# Patient Record
Sex: FEMALE | Race: OTHER | HISPANIC OR LATINO | URBAN - METROPOLITAN AREA
[De-identification: names, ages, dates, MRNs, and addresses within clinical notes are randomized per-mention and may not be internally consistent; named-entity substitution may affect disease eponyms.]

---

## 2018-06-01 ENCOUNTER — OUTPATIENT (OUTPATIENT)
Dept: OUTPATIENT SERVICES | Facility: HOSPITAL | Age: 73
LOS: 1 days | Discharge: HOME | End: 2018-06-01

## 2019-05-13 ENCOUNTER — APPOINTMENT (OUTPATIENT)
Dept: OTOLARYNGOLOGY | Facility: CLINIC | Age: 74
End: 2019-05-13
Payer: MEDICARE

## 2019-05-13 VITALS — WEIGHT: 124 LBS | HEIGHT: 58.27 IN | BODY MASS INDEX: 25.68 KG/M2

## 2019-05-13 VITALS
OXYGEN SATURATION: 95 % | HEART RATE: 64 BPM | TEMPERATURE: 97.9 F | DIASTOLIC BLOOD PRESSURE: 75 MMHG | SYSTOLIC BLOOD PRESSURE: 122 MMHG

## 2019-05-13 DIAGNOSIS — Z86.39 PERSONAL HISTORY OF OTHER ENDOCRINE, NUTRITIONAL AND METABOLIC DISEASE: ICD-10-CM

## 2019-05-13 DIAGNOSIS — Z87.19 PERSONAL HISTORY OF OTHER DISEASES OF THE DIGESTIVE SYSTEM: ICD-10-CM

## 2019-05-13 DIAGNOSIS — Z80.7 FAMILY HISTORY OF OTHER MALIGNANT NEOPLASMS OF LYMPHOID, HEMATOPOIETIC AND RELATED TISSUES: ICD-10-CM

## 2019-05-13 DIAGNOSIS — Z86.69 PERSONAL HISTORY OF OTHER DISEASES OF THE NERVOUS SYSTEM AND SENSE ORGANS: ICD-10-CM

## 2019-05-13 PROBLEM — Z00.00 ENCOUNTER FOR PREVENTIVE HEALTH EXAMINATION: Status: ACTIVE | Noted: 2019-05-13

## 2019-05-13 PROCEDURE — 31575 DIAGNOSTIC LARYNGOSCOPY: CPT

## 2019-05-13 PROCEDURE — 99203 OFFICE O/P NEW LOW 30 MIN: CPT | Mod: 25

## 2019-05-13 RX ORDER — LEVOTHYROXINE SODIUM 0.05 MG/1
50 TABLET ORAL
Refills: 0 | Status: ACTIVE | COMMUNITY

## 2019-05-13 RX ORDER — OMEPRAZOLE 40 MG/1
40 CAPSULE, DELAYED RELEASE ORAL TWICE DAILY
Qty: 60 | Refills: 10 | Status: ACTIVE | COMMUNITY
Start: 2019-05-13 | End: 1900-01-01

## 2019-05-13 RX ORDER — VITAMIN E 268 MG
400 CAPSULE ORAL
Refills: 0 | Status: ACTIVE | COMMUNITY

## 2019-05-13 NOTE — REVIEW OF SYSTEMS
[Patient Intake Form Reviewed] : Patient intake form was reviewed [Throat Pain] : throat pain [As Noted in HPI] : as noted in HPI [Throat Dryness] : throat dryness [Negative] : Heme/Lymph

## 2019-06-03 NOTE — CONSULT LETTER
[Dear  ___] : Dear  [unfilled], [Consult Letter:] : I had the pleasure of evaluating your patient, [unfilled]. [Please see my note below.] : Please see my note below. [Consult Closing:] : Thank you very much for allowing me to participate in the care of this patient.  If you have any questions, please do not hesitate to contact me. [Sincerely,] : Sincerely, [FreeTextEntry3] : Flaco Alford MD, FACS\par Professor of Otolaryngology, [employer] at [facility]\par Director, [facility], [facility]\par , Head & Ne[facility] Service Line, [facility]\par

## 2019-06-03 NOTE — HISTORY OF PRESENT ILLNESS
[de-identified] : 73 years old female patient with history of throat pain for over 1 years.  Patient is present today in the office Pharyngitis.  GERD injury, Lingual tonsil hypertrophy

## 2019-06-03 NOTE — REASON FOR VISIT
[Initial Evaluation] : an initial evaluation for [FreeTextEntry2] : throat pain for over 1 years. Patient states her level of severity is a level 8 out of 10 and it occurs constant.  Patient states nothing helps to improve or worsens her throat pain for over 1 years

## 2019-06-03 NOTE — PROCEDURE
[Image(s) Captured] : image(s) captured and filed [Topical Lidocaine] : topical lidocaine [Flexible Endoscope] : examined with the flexible endoscope [Serial Number: ___] : Serial Number: [unfilled] [Glottis Arytenoid Cartilages Erythema] : bilateral arytenoid ~M erythema [de-identified] : Pharyngitis.  GERD injury, Lingual tonsil hypertrophy.  Lingual Tonsil Hypertrophy

## 2019-06-13 ENCOUNTER — APPOINTMENT (OUTPATIENT)
Dept: OTOLARYNGOLOGY | Facility: CLINIC | Age: 74
End: 2019-06-13
Payer: MEDICARE

## 2019-06-13 VITALS
HEART RATE: 65 BPM | OXYGEN SATURATION: 96 % | RESPIRATION RATE: 15 BRPM | DIASTOLIC BLOOD PRESSURE: 76 MMHG | TEMPERATURE: 98.1 F | SYSTOLIC BLOOD PRESSURE: 138 MMHG

## 2019-06-13 DIAGNOSIS — Z87.09 PERSONAL HISTORY OF OTHER DISEASES OF THE RESPIRATORY SYSTEM: ICD-10-CM

## 2019-06-13 DIAGNOSIS — R49.9 UNSPECIFIED VOICE AND RESONANCE DISORDER: ICD-10-CM

## 2019-06-13 DIAGNOSIS — R07.0 PAIN IN THROAT: ICD-10-CM

## 2019-06-13 DIAGNOSIS — K21.9 GASTRO-ESOPHAGEAL REFLUX DISEASE W/OUT ESOPHAGITIS: ICD-10-CM

## 2019-06-13 PROCEDURE — 99213 OFFICE O/P EST LOW 20 MIN: CPT

## 2019-06-13 NOTE — HISTORY OF PRESENT ILLNESS
[de-identified] : 73 years old female patient with history of Pharyngitis.  GERD injury, Lingual tonsil hypertrophy.  Patient is present today in the office with improved  Pharyngitis.  GERD injury, Lingual tonsil hypertrophy

## 2019-06-13 NOTE — CONSULT LETTER
[Dear  ___] : Dear  [unfilled], [Consult Letter:] : I had the pleasure of evaluating your patient, [unfilled]. [Please see my note below.] : Please see my note below. [Consult Closing:] : Thank you very much for allowing me to participate in the care of this patient.  If you have any questions, please do not hesitate to contact me. [Sincerely,] : Sincerely, [FreeTextEntry3] : Flaco Alford MD, FACS\par Professor of Otolaryngology, [facility] [employer] at [facility]\par Director, [facility], [facility] [place]\par , [facility] Service Line, [facility]\par

## 2019-06-13 NOTE — REVIEW OF SYSTEMS
[As Noted in HPI] : as noted in HPI [Patient Intake Form Reviewed] : Patient intake form was reviewed [Throat Pain] : throat pain [Throat Dryness] : throat dryness [Negative] : Endocrine

## 2019-06-13 NOTE — REASON FOR VISIT
[Subsequent Evaluation] : a subsequent evaluation for [FreeTextEntry2] : Pharyngitis.  GERD injury, Lingual tonsil hypertrophy

## 2019-06-20 ENCOUNTER — RESULT REVIEW (OUTPATIENT)
Age: 74
End: 2019-06-20

## 2019-06-21 ENCOUNTER — APPOINTMENT (OUTPATIENT)
Dept: OBGYN | Facility: CLINIC | Age: 74
End: 2019-06-21
Payer: MEDICARE

## 2019-06-21 ENCOUNTER — OUTPATIENT (OUTPATIENT)
Dept: OUTPATIENT SERVICES | Facility: HOSPITAL | Age: 74
LOS: 1 days | End: 2019-06-21
Payer: MEDICARE

## 2019-06-21 VITALS
DIASTOLIC BLOOD PRESSURE: 67 MMHG | HEART RATE: 59 BPM | HEIGHT: 58.27 IN | TEMPERATURE: 98.2 F | WEIGHT: 120 LBS | BODY MASS INDEX: 24.85 KG/M2 | SYSTOLIC BLOOD PRESSURE: 134 MMHG

## 2019-06-21 DIAGNOSIS — Z00.00 ENCOUNTER FOR GENERAL ADULT MEDICAL EXAMINATION WITHOUT ABNORMAL FINDINGS: ICD-10-CM

## 2019-06-21 DIAGNOSIS — N76.2 ACUTE VULVITIS: ICD-10-CM

## 2019-06-21 PROCEDURE — 87624 HPV HI-RISK TYP POOLED RSLT: CPT

## 2019-06-21 PROCEDURE — G0463: CPT

## 2019-06-21 PROCEDURE — 88175 CYTOPATH C/V AUTO FLUID REDO: CPT

## 2019-06-21 PROCEDURE — 99203 OFFICE O/P NEW LOW 30 MIN: CPT

## 2019-06-21 RX ORDER — CLOTRIMAZOLE AND BETAMETHASONE DIPROPIONATE 10; .5 MG/G; MG/G
1-0.05 CREAM TOPICAL TWICE DAILY
Qty: 1 | Refills: 0 | Status: ACTIVE | COMMUNITY
Start: 2019-06-21 | End: 1900-01-01

## 2019-06-21 NOTE — PHYSICAL EXAM
[Awake] : awake [Alert] : alert [Acute Distress] : no acute distress [Mass] : no breast mass [Nipple Discharge] : no nipple discharge [Axillary LAD] : no axillary lymphadenopathy [Tender] : non tender [Soft] : soft [No Lesions] : no genitalia lesions [Oriented x3] : oriented to person, place, and time [Vulvitis] : vulvitis [Normal] : cervix [No Bleeding] : there was no active vaginal bleeding [Uterine Adnexae] : were not tender and not enlarged [de-identified] : vulva- slight erythema

## 2019-06-21 NOTE — HISTORY OF PRESENT ILLNESS
[Menarche Age: ____] : age at menarche was [unfilled] [Menopause Age: ____] : age at menopause was [unfilled] [Sexually Active] : is not sexually active

## 2019-06-22 LAB — HPV HIGH+LOW RISK DNA PNL CVX: SIGNIFICANT CHANGE UP

## 2019-06-24 DIAGNOSIS — Z01.419 ENCOUNTER FOR GYNECOLOGICAL EXAMINATION (GENERAL) (ROUTINE) WITHOUT ABNORMAL FINDINGS: ICD-10-CM

## 2019-06-24 DIAGNOSIS — Z76.2 ENCOUNTER FOR HEALTH SUPERVISION AND CARE OF OTHER HEALTHY INFANT AND CHILD: ICD-10-CM

## 2019-06-25 LAB — CYTOLOGY SPEC DOC CYTO: SIGNIFICANT CHANGE UP

## 2019-07-08 ENCOUNTER — OUTPATIENT (OUTPATIENT)
Dept: OUTPATIENT SERVICES | Facility: HOSPITAL | Age: 74
LOS: 1 days | End: 2019-07-08
Payer: MEDICARE

## 2019-07-08 ENCOUNTER — APPOINTMENT (OUTPATIENT)
Dept: OBGYN | Facility: CLINIC | Age: 74
End: 2019-07-08
Payer: MEDICARE

## 2019-07-08 VITALS
WEIGHT: 121 LBS | DIASTOLIC BLOOD PRESSURE: 70 MMHG | HEART RATE: 71 BPM | HEIGHT: 58 IN | TEMPERATURE: 97.5 F | BODY MASS INDEX: 25.4 KG/M2 | SYSTOLIC BLOOD PRESSURE: 119 MMHG

## 2019-07-08 DIAGNOSIS — Z00.00 ENCOUNTER FOR GENERAL ADULT MEDICAL EXAMINATION WITHOUT ABNORMAL FINDINGS: ICD-10-CM

## 2019-07-08 DIAGNOSIS — Z01.419 ENCOUNTER FOR GYNECOLOGICAL EXAMINATION (GENERAL) (ROUTINE) W/OUT ABNORMAL FINDINGS: ICD-10-CM

## 2019-07-08 DIAGNOSIS — Z71.2 PERSON CONSULTING FOR EXPLANATION OF EXAMINATION OR TEST FINDINGS: ICD-10-CM

## 2019-07-08 PROCEDURE — 99213 OFFICE O/P EST LOW 20 MIN: CPT

## 2019-07-08 PROCEDURE — G0463: CPT

## 2019-07-08 NOTE — CHIEF COMPLAINT
[Follow Up] : follow up GYN visit [FreeTextEntry1] : Presents for follow-up of pap smear results: \par \par (06/21/2019) Negative / (-) HR HPV\par \par Patient denies history of urinary stress, urge incontinence or difficulty with voiding.  \par \par Last Mammogram done ~ 4 months ago, WNL as per patient. \par \par  ~ 25 years, denies hx/o PMB\par

## 2019-07-12 DIAGNOSIS — Z71.2 PERSON CONSULTING FOR EXPLANATION OF EXAMINATION OR TEST FINDINGS: ICD-10-CM

## 2020-12-21 PROBLEM — Z87.09 HISTORY OF PHARYNGITIS: Status: RESOLVED | Noted: 2019-05-13 | Resolved: 2020-12-21

## 2022-07-28 ENCOUNTER — APPOINTMENT (OUTPATIENT)
Dept: ORTHOPEDIC SURGERY | Facility: CLINIC | Age: 77
End: 2022-07-28

## 2022-07-28 VITALS
HEIGHT: 58 IN | HEART RATE: 60 BPM | SYSTOLIC BLOOD PRESSURE: 119 MMHG | WEIGHT: 121 LBS | DIASTOLIC BLOOD PRESSURE: 69 MMHG | BODY MASS INDEX: 25.4 KG/M2

## 2022-07-28 PROCEDURE — 99204 OFFICE O/P NEW MOD 45 MIN: CPT

## 2022-07-28 PROCEDURE — 73110 X-RAY EXAM OF WRIST: CPT | Mod: RT

## 2022-07-28 RX ORDER — SIMVASTATIN 20 MG/1
20 TABLET, FILM COATED ORAL
Qty: 30 | Refills: 0 | Status: ACTIVE | COMMUNITY
Start: 2022-03-21

## 2022-07-28 RX ORDER — AMLODIPINE BESYLATE 5 MG/1
5 TABLET ORAL
Qty: 30 | Refills: 0 | Status: ACTIVE | COMMUNITY
Start: 2022-03-21

## 2022-08-11 ENCOUNTER — OUTPATIENT (OUTPATIENT)
Dept: OUTPATIENT SERVICES | Facility: HOSPITAL | Age: 77
LOS: 1 days | End: 2022-08-11
Payer: COMMERCIAL

## 2022-08-11 ENCOUNTER — RESULT REVIEW (OUTPATIENT)
Age: 77
End: 2022-08-11

## 2022-08-11 ENCOUNTER — APPOINTMENT (OUTPATIENT)
Dept: MRI IMAGING | Facility: CLINIC | Age: 77
End: 2022-08-11

## 2022-08-11 DIAGNOSIS — M25.531 PAIN IN RIGHT WRIST: ICD-10-CM

## 2022-08-11 PROCEDURE — 73221 MRI JOINT UPR EXTREM W/O DYE: CPT

## 2022-08-11 PROCEDURE — 73221 MRI JOINT UPR EXTREM W/O DYE: CPT | Mod: 26,RT

## 2022-08-15 ENCOUNTER — APPOINTMENT (OUTPATIENT)
Dept: ORTHOPEDIC SURGERY | Facility: CLINIC | Age: 77
End: 2022-08-15

## 2022-08-15 PROCEDURE — 99441: CPT

## 2022-08-16 ENCOUNTER — APPOINTMENT (OUTPATIENT)
Dept: ORTHOPEDIC SURGERY | Facility: CLINIC | Age: 77
End: 2022-08-16

## 2022-08-16 DIAGNOSIS — M25.531 PAIN IN RIGHT WRIST: ICD-10-CM

## 2022-08-16 DIAGNOSIS — T14.8XXA OTHER INJURY OF UNSPECIFIED BODY REGION, INITIAL ENCOUNTER: ICD-10-CM

## 2022-08-16 PROCEDURE — 99441: CPT

## 2022-08-25 ENCOUNTER — APPOINTMENT (OUTPATIENT)
Dept: ORTHOPEDIC SURGERY | Facility: CLINIC | Age: 77
End: 2022-08-25

## 2022-08-25 VITALS
BODY MASS INDEX: 25.4 KG/M2 | SYSTOLIC BLOOD PRESSURE: 123 MMHG | WEIGHT: 121 LBS | HEIGHT: 58 IN | DIASTOLIC BLOOD PRESSURE: 75 MMHG | HEART RATE: 69 BPM

## 2022-08-25 DIAGNOSIS — S62.024D NONDISPLACED FRACTURE OF MIDDLE THIRD OF NAVICULAR [SCAPHOID] BONE OF RIGHT WRIST, SUBSEQUENT ENCOUNTER FOR FRACTURE WITH ROUTINE HEALING: ICD-10-CM

## 2022-08-25 PROCEDURE — 99214 OFFICE O/P EST MOD 30 MIN: CPT

## 2022-08-25 PROCEDURE — 73110 X-RAY EXAM OF WRIST: CPT | Mod: RT

## 2022-09-07 PROBLEM — T14.8XXA CONTUSION OF BONE: Status: ACTIVE | Noted: 2022-08-25

## 2022-09-07 PROBLEM — M25.531 RIGHT WRIST PAIN: Status: ACTIVE | Noted: 2022-07-28

## 2022-09-22 ENCOUNTER — APPOINTMENT (OUTPATIENT)
Dept: ORTHOPEDIC SURGERY | Facility: CLINIC | Age: 77
End: 2022-09-22

## 2023-10-25 ENCOUNTER — APPOINTMENT (OUTPATIENT)
Dept: UROLOGY | Facility: CLINIC | Age: 78
End: 2023-10-25
Payer: MEDICARE

## 2023-10-25 VITALS
SYSTOLIC BLOOD PRESSURE: 164 MMHG | RESPIRATION RATE: 16 BRPM | WEIGHT: 121 LBS | DIASTOLIC BLOOD PRESSURE: 79 MMHG | BODY MASS INDEX: 25.4 KG/M2 | HEIGHT: 58 IN | OXYGEN SATURATION: 97 % | HEART RATE: 62 BPM

## 2023-10-25 DIAGNOSIS — N39.3 STRESS INCONTINENCE (FEMALE) (MALE): ICD-10-CM

## 2023-10-25 DIAGNOSIS — N95.1 MENOPAUSAL AND FEMALE CLIMACTERIC STATES: ICD-10-CM

## 2023-10-25 DIAGNOSIS — R39.15 URGENCY OF URINATION: ICD-10-CM

## 2023-10-25 LAB
BILIRUB UR QL STRIP: NEGATIVE
CLARITY UR: CLEAR
COLLECTION METHOD: NORMAL
GLUCOSE UR-MCNC: NEGATIVE
HCG UR QL: 0.2 EU/DL
HGB UR QL STRIP.AUTO: NEGATIVE
KETONES UR-MCNC: NEGATIVE
LEUKOCYTE ESTERASE UR QL STRIP: NEGATIVE
NITRITE UR QL STRIP: NEGATIVE
PH UR STRIP: 5.5
PROT UR STRIP-MCNC: NEGATIVE
SP GR UR STRIP: 1.01

## 2023-10-25 PROCEDURE — 81003 URINALYSIS AUTO W/O SCOPE: CPT | Mod: QW

## 2023-10-25 PROCEDURE — 99203 OFFICE O/P NEW LOW 30 MIN: CPT | Mod: 25

## 2023-10-25 PROCEDURE — 51798 US URINE CAPACITY MEASURE: CPT

## 2023-10-25 RX ORDER — ESTRADIOL 0.1 MG/G
0.1 CREAM VAGINAL
Qty: 1 | Refills: 4 | Status: ACTIVE | COMMUNITY
Start: 2023-10-25 | End: 1900-01-01

## 2023-12-06 ENCOUNTER — NON-APPOINTMENT (OUTPATIENT)
Age: 78
End: 2023-12-06

## 2023-12-06 ENCOUNTER — APPOINTMENT (OUTPATIENT)
Dept: UROLOGY | Facility: CLINIC | Age: 78
End: 2023-12-06

## 2024-10-29 ENCOUNTER — INPATIENT (INPATIENT)
Facility: HOSPITAL | Age: 79
LOS: 1 days | Discharge: HOME HEALTH | DRG: 536 | End: 2024-10-31
Attending: HOSPITALIST | Admitting: INTERNAL MEDICINE
Payer: MEDICARE

## 2024-10-29 VITALS
RESPIRATION RATE: 18 BRPM | OXYGEN SATURATION: 99 % | HEART RATE: 88 BPM | TEMPERATURE: 98 F | SYSTOLIC BLOOD PRESSURE: 129 MMHG | DIASTOLIC BLOOD PRESSURE: 67 MMHG

## 2024-10-29 DIAGNOSIS — S72.001A FRACTURE OF UNSPECIFIED PART OF NECK OF RIGHT FEMUR, INITIAL ENCOUNTER FOR CLOSED FRACTURE: ICD-10-CM

## 2024-10-29 LAB
ABO RH CONFIRMATION: SIGNIFICANT CHANGE UP
ALBUMIN SERPL ELPH-MCNC: 3.6 G/DL — SIGNIFICANT CHANGE UP (ref 3.3–5)
ALBUMIN SERPL ELPH-MCNC: 3.8 G/DL — SIGNIFICANT CHANGE UP (ref 3.3–5)
ALP SERPL-CCNC: 109 U/L — SIGNIFICANT CHANGE UP (ref 40–120)
ALP SERPL-CCNC: 94 U/L — SIGNIFICANT CHANGE UP (ref 40–120)
ALT FLD-CCNC: 26 U/L — SIGNIFICANT CHANGE UP (ref 12–78)
ALT FLD-CCNC: 28 U/L — SIGNIFICANT CHANGE UP (ref 12–78)
ANION GAP SERPL CALC-SCNC: 4 MMOL/L — LOW (ref 5–17)
ANION GAP SERPL CALC-SCNC: 5 MMOL/L — SIGNIFICANT CHANGE UP (ref 5–17)
APPEARANCE UR: CLEAR — SIGNIFICANT CHANGE UP
APTT BLD: 28.7 SEC — SIGNIFICANT CHANGE UP (ref 24.5–35.6)
APTT BLD: 28.8 SEC — SIGNIFICANT CHANGE UP (ref 24.5–35.6)
AST SERPL-CCNC: 14 U/L — LOW (ref 15–37)
AST SERPL-CCNC: 19 U/L — SIGNIFICANT CHANGE UP (ref 15–37)
BASOPHILS # BLD AUTO: 0.02 K/UL — SIGNIFICANT CHANGE UP (ref 0–0.2)
BASOPHILS # BLD AUTO: 0.04 K/UL — SIGNIFICANT CHANGE UP (ref 0–0.2)
BASOPHILS NFR BLD AUTO: 0.3 % — SIGNIFICANT CHANGE UP (ref 0–2)
BASOPHILS NFR BLD AUTO: 0.5 % — SIGNIFICANT CHANGE UP (ref 0–2)
BILIRUB SERPL-MCNC: 0.3 MG/DL — SIGNIFICANT CHANGE UP (ref 0.2–1.2)
BILIRUB SERPL-MCNC: 0.4 MG/DL — SIGNIFICANT CHANGE UP (ref 0.2–1.2)
BILIRUB UR-MCNC: NEGATIVE — SIGNIFICANT CHANGE UP
BLD GP AB SCN SERPL QL: SIGNIFICANT CHANGE UP
BUN SERPL-MCNC: 12 MG/DL — SIGNIFICANT CHANGE UP (ref 7–23)
BUN SERPL-MCNC: 14 MG/DL — SIGNIFICANT CHANGE UP (ref 7–23)
CALCIUM SERPL-MCNC: 9.2 MG/DL — SIGNIFICANT CHANGE UP (ref 8.5–10.1)
CALCIUM SERPL-MCNC: 9.6 MG/DL — SIGNIFICANT CHANGE UP (ref 8.5–10.1)
CHLORIDE SERPL-SCNC: 108 MMOL/L — SIGNIFICANT CHANGE UP (ref 96–108)
CHLORIDE SERPL-SCNC: 110 MMOL/L — HIGH (ref 96–108)
CO2 SERPL-SCNC: 27 MMOL/L — SIGNIFICANT CHANGE UP (ref 22–31)
CO2 SERPL-SCNC: 28 MMOL/L — SIGNIFICANT CHANGE UP (ref 22–31)
COLOR SPEC: YELLOW — SIGNIFICANT CHANGE UP
CREAT SERPL-MCNC: 0.63 MG/DL — SIGNIFICANT CHANGE UP (ref 0.5–1.3)
CREAT SERPL-MCNC: 0.66 MG/DL — SIGNIFICANT CHANGE UP (ref 0.5–1.3)
DIFF PNL FLD: NEGATIVE — SIGNIFICANT CHANGE UP
EGFR: 89 ML/MIN/1.73M2 — SIGNIFICANT CHANGE UP
EGFR: 90 ML/MIN/1.73M2 — SIGNIFICANT CHANGE UP
EOSINOPHIL # BLD AUTO: 0.16 K/UL — SIGNIFICANT CHANGE UP (ref 0–0.5)
EOSINOPHIL # BLD AUTO: 0.17 K/UL — SIGNIFICANT CHANGE UP (ref 0–0.5)
EOSINOPHIL NFR BLD AUTO: 2.1 % — SIGNIFICANT CHANGE UP (ref 0–6)
EOSINOPHIL NFR BLD AUTO: 2.4 % — SIGNIFICANT CHANGE UP (ref 0–6)
GLUCOSE SERPL-MCNC: 105 MG/DL — HIGH (ref 70–99)
GLUCOSE SERPL-MCNC: 117 MG/DL — HIGH (ref 70–99)
GLUCOSE UR QL: NEGATIVE MG/DL — SIGNIFICANT CHANGE UP
HCT VFR BLD CALC: 40.6 % — SIGNIFICANT CHANGE UP (ref 34.5–45)
HCT VFR BLD CALC: 40.8 % — SIGNIFICANT CHANGE UP (ref 34.5–45)
HGB BLD-MCNC: 13.6 G/DL — SIGNIFICANT CHANGE UP (ref 11.5–15.5)
HGB BLD-MCNC: 13.6 G/DL — SIGNIFICANT CHANGE UP (ref 11.5–15.5)
IMM GRANULOCYTES NFR BLD AUTO: 0.2 % — SIGNIFICANT CHANGE UP (ref 0–0.9)
IMM GRANULOCYTES NFR BLD AUTO: 0.3 % — SIGNIFICANT CHANGE UP (ref 0–0.9)
INR BLD: 0.91 RATIO — SIGNIFICANT CHANGE UP (ref 0.85–1.16)
INR BLD: 0.95 RATIO — SIGNIFICANT CHANGE UP (ref 0.85–1.16)
KETONES UR-MCNC: NEGATIVE MG/DL — SIGNIFICANT CHANGE UP
LEUKOCYTE ESTERASE UR-ACNC: NEGATIVE — SIGNIFICANT CHANGE UP
LYMPHOCYTES # BLD AUTO: 2.06 K/UL — SIGNIFICANT CHANGE UP (ref 1–3.3)
LYMPHOCYTES # BLD AUTO: 2.07 K/UL — SIGNIFICANT CHANGE UP (ref 1–3.3)
LYMPHOCYTES # BLD AUTO: 25.8 % — SIGNIFICANT CHANGE UP (ref 13–44)
LYMPHOCYTES # BLD AUTO: 30.9 % — SIGNIFICANT CHANGE UP (ref 13–44)
MAGNESIUM SERPL-MCNC: 2.2 MG/DL — SIGNIFICANT CHANGE UP (ref 1.6–2.6)
MCHC RBC-ENTMCNC: 30.8 PG — SIGNIFICANT CHANGE UP (ref 27–34)
MCHC RBC-ENTMCNC: 31 PG — SIGNIFICANT CHANGE UP (ref 27–34)
MCHC RBC-ENTMCNC: 33.3 G/DL — SIGNIFICANT CHANGE UP (ref 32–36)
MCHC RBC-ENTMCNC: 33.5 G/DL — SIGNIFICANT CHANGE UP (ref 32–36)
MCV RBC AUTO: 92.5 FL — SIGNIFICANT CHANGE UP (ref 80–100)
MCV RBC AUTO: 92.5 FL — SIGNIFICANT CHANGE UP (ref 80–100)
MONOCYTES # BLD AUTO: 0.65 K/UL — SIGNIFICANT CHANGE UP (ref 0–0.9)
MONOCYTES # BLD AUTO: 0.66 K/UL — SIGNIFICANT CHANGE UP (ref 0–0.9)
MONOCYTES NFR BLD AUTO: 8.2 % — SIGNIFICANT CHANGE UP (ref 2–14)
MONOCYTES NFR BLD AUTO: 9.7 % — SIGNIFICANT CHANGE UP (ref 2–14)
NEUTROPHILS # BLD AUTO: 3.76 K/UL — SIGNIFICANT CHANGE UP (ref 1.8–7.4)
NEUTROPHILS # BLD AUTO: 5.06 K/UL — SIGNIFICANT CHANGE UP (ref 1.8–7.4)
NEUTROPHILS NFR BLD AUTO: 56.4 % — SIGNIFICANT CHANGE UP (ref 43–77)
NEUTROPHILS NFR BLD AUTO: 63.2 % — SIGNIFICANT CHANGE UP (ref 43–77)
NITRITE UR-MCNC: NEGATIVE — SIGNIFICANT CHANGE UP
PH UR: 5.5 — SIGNIFICANT CHANGE UP (ref 5–8)
PHOSPHATE SERPL-MCNC: 3.5 MG/DL — SIGNIFICANT CHANGE UP (ref 2.5–4.5)
PLATELET # BLD AUTO: 154 K/UL — SIGNIFICANT CHANGE UP (ref 150–400)
PLATELET # BLD AUTO: 168 K/UL — SIGNIFICANT CHANGE UP (ref 150–400)
POTASSIUM SERPL-MCNC: 3.7 MMOL/L — SIGNIFICANT CHANGE UP (ref 3.5–5.3)
POTASSIUM SERPL-MCNC: 3.7 MMOL/L — SIGNIFICANT CHANGE UP (ref 3.5–5.3)
POTASSIUM SERPL-SCNC: 3.7 MMOL/L — SIGNIFICANT CHANGE UP (ref 3.5–5.3)
POTASSIUM SERPL-SCNC: 3.7 MMOL/L — SIGNIFICANT CHANGE UP (ref 3.5–5.3)
PROT SERPL-MCNC: 7.2 GM/DL — SIGNIFICANT CHANGE UP (ref 6–8.3)
PROT SERPL-MCNC: 7.5 GM/DL — SIGNIFICANT CHANGE UP (ref 6–8.3)
PROT UR-MCNC: NEGATIVE MG/DL — SIGNIFICANT CHANGE UP
PROTHROM AB SERPL-ACNC: 10.8 SEC — SIGNIFICANT CHANGE UP (ref 9.9–13.4)
PROTHROM AB SERPL-ACNC: 11.2 SEC — SIGNIFICANT CHANGE UP (ref 9.9–13.4)
RBC # BLD: 4.39 M/UL — SIGNIFICANT CHANGE UP (ref 3.8–5.2)
RBC # BLD: 4.41 M/UL — SIGNIFICANT CHANGE UP (ref 3.8–5.2)
RBC # FLD: 12.5 % — SIGNIFICANT CHANGE UP (ref 10.3–14.5)
RBC # FLD: 12.6 % — SIGNIFICANT CHANGE UP (ref 10.3–14.5)
SODIUM SERPL-SCNC: 140 MMOL/L — SIGNIFICANT CHANGE UP (ref 135–145)
SODIUM SERPL-SCNC: 142 MMOL/L — SIGNIFICANT CHANGE UP (ref 135–145)
SP GR SPEC: 1.01 — SIGNIFICANT CHANGE UP (ref 1–1.03)
UROBILINOGEN FLD QL: 0.2 MG/DL — SIGNIFICANT CHANGE UP (ref 0.2–1)
WBC # BLD: 6.67 K/UL — SIGNIFICANT CHANGE UP (ref 3.8–10.5)
WBC # BLD: 8.02 K/UL — SIGNIFICANT CHANGE UP (ref 3.8–10.5)
WBC # FLD AUTO: 6.67 K/UL — SIGNIFICANT CHANGE UP (ref 3.8–10.5)
WBC # FLD AUTO: 8.02 K/UL — SIGNIFICANT CHANGE UP (ref 3.8–10.5)

## 2024-10-29 PROCEDURE — 76376 3D RENDER W/INTRP POSTPROCES: CPT | Mod: 26

## 2024-10-29 PROCEDURE — 80053 COMPREHEN METABOLIC PANEL: CPT

## 2024-10-29 PROCEDURE — 73502 X-RAY EXAM HIP UNI 2-3 VIEWS: CPT | Mod: 26,RT

## 2024-10-29 PROCEDURE — 85730 THROMBOPLASTIN TIME PARTIAL: CPT

## 2024-10-29 PROCEDURE — 73552 X-RAY EXAM OF FEMUR 2/>: CPT | Mod: 26,RT

## 2024-10-29 PROCEDURE — 36415 COLL VENOUS BLD VENIPUNCTURE: CPT

## 2024-10-29 PROCEDURE — 84100 ASSAY OF PHOSPHORUS: CPT

## 2024-10-29 PROCEDURE — 82306 VITAMIN D 25 HYDROXY: CPT

## 2024-10-29 PROCEDURE — 72192 CT PELVIS W/O DYE: CPT | Mod: 26,MC

## 2024-10-29 PROCEDURE — 99223 1ST HOSP IP/OBS HIGH 75: CPT

## 2024-10-29 PROCEDURE — 70450 CT HEAD/BRAIN W/O DYE: CPT | Mod: 26,MC

## 2024-10-29 PROCEDURE — 72125 CT NECK SPINE W/O DYE: CPT | Mod: 26,MC

## 2024-10-29 PROCEDURE — 72195 MRI PELVIS W/O DYE: CPT | Mod: MC

## 2024-10-29 PROCEDURE — 85025 COMPLETE CBC W/AUTO DIFF WBC: CPT

## 2024-10-29 PROCEDURE — 97162 PT EVAL MOD COMPLEX 30 MIN: CPT | Mod: GP

## 2024-10-29 PROCEDURE — 99221 1ST HOSP IP/OBS SF/LOW 40: CPT

## 2024-10-29 PROCEDURE — 83735 ASSAY OF MAGNESIUM: CPT

## 2024-10-29 PROCEDURE — 71045 X-RAY EXAM CHEST 1 VIEW: CPT | Mod: 26

## 2024-10-29 PROCEDURE — 97530 THERAPEUTIC ACTIVITIES: CPT | Mod: GP

## 2024-10-29 PROCEDURE — 93010 ELECTROCARDIOGRAM REPORT: CPT

## 2024-10-29 PROCEDURE — 72195 MRI PELVIS W/O DYE: CPT | Mod: 26

## 2024-10-29 PROCEDURE — 99285 EMERGENCY DEPT VISIT HI MDM: CPT

## 2024-10-29 PROCEDURE — 97116 GAIT TRAINING THERAPY: CPT | Mod: GP

## 2024-10-29 PROCEDURE — 85610 PROTHROMBIN TIME: CPT

## 2024-10-29 PROCEDURE — 73030 X-RAY EXAM OF SHOULDER: CPT | Mod: 26,RT

## 2024-10-29 RX ORDER — MORPHINE SULFATE 30 MG/1
2 TABLET, EXTENDED RELEASE ORAL EVERY 4 HOURS
Refills: 0 | Status: DISCONTINUED | OUTPATIENT
Start: 2024-10-29 | End: 2024-10-30

## 2024-10-29 RX ORDER — AMLODIPINE BESYLATE 10 MG
1 TABLET ORAL
Refills: 0 | DISCHARGE

## 2024-10-29 RX ORDER — ENOXAPARIN SODIUM 40MG/0.4ML
40 SYRINGE (ML) SUBCUTANEOUS EVERY 24 HOURS
Refills: 0 | Status: DISCONTINUED | OUTPATIENT
Start: 2024-10-29 | End: 2024-10-31

## 2024-10-29 RX ORDER — AMLODIPINE BESYLATE 10 MG
5 TABLET ORAL DAILY
Refills: 0 | Status: DISCONTINUED | OUTPATIENT
Start: 2024-10-29 | End: 2024-10-31

## 2024-10-29 RX ORDER — DIPHENHYDRAMINE HCL 12.5MG/5ML
1 ELIXIR ORAL
Refills: 0 | DISCHARGE

## 2024-10-29 RX ORDER — TRIAMCINOLONE ACETONIDE/L.S.B. 0.5 %
1 CREAM (GRAM) TOPICAL
Refills: 0 | DISCHARGE

## 2024-10-29 RX ORDER — LEVOTHYROXINE SODIUM 88 MCG
50 TABLET ORAL DAILY
Refills: 0 | Status: DISCONTINUED | OUTPATIENT
Start: 2024-10-29 | End: 2024-10-31

## 2024-10-29 RX ORDER — ACETAMINOPHEN 500 MG
1000 TABLET ORAL ONCE
Refills: 0 | Status: COMPLETED | OUTPATIENT
Start: 2024-10-29 | End: 2024-10-29

## 2024-10-29 RX ORDER — LEVOTHYROXINE SODIUM 88 MCG
1 TABLET ORAL
Refills: 0 | DISCHARGE

## 2024-10-29 RX ORDER — ACETAMINOPHEN 500 MG
650 TABLET ORAL ONCE
Refills: 0 | Status: DISCONTINUED | OUTPATIENT
Start: 2024-10-29 | End: 2024-10-29

## 2024-10-29 RX ADMIN — Medication 400 MILLIGRAM(S): at 16:06

## 2024-10-29 NOTE — ED PROVIDER NOTE - PHYSICAL EXAMINATION
Drink plenty of fluid  Take MIRALAX 1 capful in 8 ounces of water once a day for the next 7 days  Return to Emergency room for abdominal pain, fever, painful urination, vomiting  Follow up with her DOCTOR in 2 days  Call and make an appointment with UROLOGY in a week
tender right hip and groin

## 2024-10-29 NOTE — H&P ADULT - ASSESSMENT
77 y/o F w/ PMH of HTN, hypothyroidism, glaucoma, dyslipidemia, p/w mechanical fall.      *R greater trochanteric fracture s/p mechanical fall   -CT: Slightly displaced transverse fracture involves the superior margin of the right greater trochanter.  -MRI: Acute right greater trochanteric fracture with   intertrochanteric extension.  -As per ortho, patient to have non-operative management as this time.   -Pain control   -EKG: NSR 70 bpm      *H/o hypothyroidism / galucoma / dyslipidemia   -C/w home meds     *DVT ppx  -Lovenox started by ortho     >75 mins required for admission  79 y/o F w/ PMH of HTN, hypothyroidism, glaucoma, dyslipidemia, p/w mechanical fall.      *R greater trochanteric fracture s/p mechanical fall   -CT: Slightly displaced transverse fracture involves the superior margin of the right greater trochanter.  -MRI: Acute right greater trochanteric fracture with   intertrochanteric extension.  -As per ortho, patient to have non-operative management as this time.   -Pain control   -EKG: NSR 70 bpm      *H/o hypothyroidism / glaucoma / dyslipidemia   -C/w home meds     *DVT ppx  -Lovenox started by ortho     >75 mins required for admission

## 2024-10-29 NOTE — ED ADULT NURSE NOTE - OBJECTIVE STATEMENT
pt presents to ER s/p fall, son at bedside. pt reports last night she tripped over the leg of a chair landing on her R side. pt states "I wanted to sleep it off to see if it would get better". reports this morning it was difficult for her to stand without being in pain. -LOC. denies any other medical complaints.

## 2024-10-29 NOTE — H&P ADULT - HISTORY OF PRESENT ILLNESS
77 y/o F w/ PMH of HTN, hypothyroidism, glaucoma, dyslipidemia, p/w mechanical fall. Patient is [place]-speaking and prefers her son at bedside to translate. Son states that last night patient was eating dinner at the dinner table, and when she went to got up her foot got caught on the chair's leg and she fell on her side. The patient denies hitting her head. Patient was unable to get up at the time and the son lifted her off the ground and put her on a chair. Patient did not want to go to the emergency room last night. This morning when she woke up her pain had gotten worse, so she came to the ED. patient is currently eating dinner at bedside and is comfortable when she's laying still.  Denies LOC, CP, SOB, cough, runny nose, sore throat, nausea, vomiting, abdominal pain, fever, chills.       PSH: R shoulder surgery     Social Hx: Denies tobacco / etoh /d rugs     Family Hx: Denies pertinent hx

## 2024-10-29 NOTE — CONSULT NOTE ADULT - TIME BILLING
40 Minutes Consult time which included reviewing the history, patient records, speaking with pt or their family member or caretaker at bedside or over the phone, discussing the etiology/nature of the symptoms injury or illness, reviewing imaging, reviewing medications, interpreting labs, discussing surgery or its possibility, ordering additional tests,  coordinating plan w Attending and relaying plan to ED and Medical Team

## 2024-10-29 NOTE — ED PROVIDER NOTE - PROGRESS NOTE DETAILS
Pt seen by ortho. They need to know if fracture line extends all the way through. MRI ordered. if so, she will need surgery. Since pt unable to ambulate will admit to medicine prior to her MRI. Emma DOWNING

## 2024-10-29 NOTE — CONSULT NOTE ADULT - SUBJECTIVE AND OBJECTIVE BOX
78y Female community ambulator presents c/o Right hip pain. SHe fell in her home yesteday evening in the kitchen as she got up from her tamara and her foot got caught. SHe fell ontot he right hip and shoulder, Her son was there as well as other family who helped her to her feet. SHe was initially able to bear a little weight but limping badly. SHe got to bed and slet the night. Today she was not able to put full weight on the right leg so her son brought her in. XR and CT today show a right GT fx. MRI is ordered and pending. No alleviating factors such as medication or positioning. Denies fall or injury or trauma. Denies numbness/tingling. Denies fever/chills. Denies pain/injury elsewhere. Pt seen in ED along with Orthopedic Team/Residents/PAs.     ROS: No CP, no SOB, No night sweats, no fevers or chills, no Numbness or tingling, No anticoagulants    MEWS Score    fall    SysAdmin_VisitLink      PAST MEDICAL & SURGICAL HISTORY:    SH: Lives at son's home with several family members right now. Walks 2mi daily. Non smoker non drinker      MEDICATIONS  (STANDING):    Allergies    No Known Allergies    Intolerances                            13.6   8.02  )-----------( 168      ( 29 Oct 2024 15:28 )             40.8     29 Oct 2024 15:28    140    |  108    |  12     ----------------------------<  105    3.7     |  28     |  0.63     Ca    9.6        29 Oct 2024 15:28    TPro  7.5    /  Alb  3.8    /  TBili  0.3    /  DBili  x      /  AST  19     /  ALT  28     /  AlkPhos  109    29 Oct 2024 15:28    PT/INR - ( 29 Oct 2024 15:28 )   PT: 10.8 sec;   INR: 0.91 ratio         PTT - ( 29 Oct 2024 15:28 )  PTT:28.7 sec  Vital Signs Last 24 Hrs  T(C): 36.6 (10-29-24 @ 14:20), Max: 36.6 (10-29-24 @ 14:20)  T(F): 97.9 (10-29-24 @ 14:20), Max: 97.9 (10-29-24 @ 14:20)  HR: 88 (10-29-24 @ 14:20) (88 - 88)  BP: 129/67 (10-29-24 @ 14:20) (129/67 - 129/67)  BP(mean): 81 (10-29-24 @ 14:20) (81 - 81)  RR: 18 (10-29-24 @ 14:20) (18 - 18)  SpO2: 99% (10-29-24 @ 14:20) (99% - 99%)    Imaging: XR Hip/Pelvis negative for fracture. Bony CT Pelvis Negative for fracture**    Physical Exam  General: NAD, Alert, Awake and oriented calm  Neurologic: No gross deficits, moving all 4s.  Head: NCAT without abrasions, lacerations, or ecchymosis to head, face, or scalp  Eyes: PERRL  Neck/C-Spine: FAROM. No bony TTP.  T/L Spine: No bony TTP, no palpable step-off.        HIPS and PELVIS: Able to SLR both hips but right is sluggish due to pain Hip.     RIGHT LE:  + TTP over GT. No open skin. No deformities or other signs of trauma at hip, knee, lower leg, ankle or foot. Full baseline painless ROM at Hip, Knee, ankle and toes. Negative log-roll and heel strike. No pain on axial loading. No groin or hip  pain on Passive internal or external rotation Able to actively SLR. SILT toes 1-5. + DP/PT pulses palpable with brisk cap refill distally. Compartments soft and compressible. EHL/FHL/TA/GS intact symmetric bilaterally.    LEFT LE:  No open skin. No deformities or other signs of trauma at hip, knee, lower leg, ankle or foot. Full baseline painless ROM at Hip, Knee, ankle and toes. Negative log-roll and heel strike. No pain on axial loading. No groin or hip  pain on Passive internal or external rotation Able to actively SLR. SILT toes 1-5. + DP/PT pulses palpable with brisk cap refill distally. Compartments soft and compressible. EHL/FHL/TA/GS intact symmetric bilaterally.    RIGHT UE: No open skin. No obvious deformities or other signs of trauma at shoulder, upper arm, elbow, forearm, wrist or hand.  Full baseline painless ROM at shoulder, elbow, wrist, and . No bony TTP. Compartments soft and compressible.     LEFT UE: No open skin. No obvious deformities or other signs of trauma at shoulder, upper arm, elbow, forearm, wrist or hand.  Full baseline painless ROM at shoulder, elbow, wrist, and . No bony TTP. Compartments soft and compressible.     **Assisted Walking exam is guarded. She cannot put full weight on right leg due to hip pain      A/P: 78y Female with Right greater trochanteric fracture, traumatic hip pain  -MRI right hip eval for extension  -NWB for now  -Pain control  -DVT PE ppx  -Spoke with pt and son regarding the fx including possibility of surgery if it extends  -Discussed with Orthopedic Attending Dr. Alegria at [phone]   78y Female community ambulator no assistive devices presents c/o Right hip pain. SHe fell in her home yesteday evening in the kitchen as she got up from her tamara and her foot got caught. SHe fell ontot he right hip and shoulder, Her son was there as well as other family who helped her to her feet. SHe was initially able to bear a little weight but limping badly. SHe got to bed and slet the night. Today she was not able to put full weight on the right leg so her son brought her in. XR and CT today show a right GT fx. MRI is ordered and pending. No alleviating factors such as medication or positioning. Denies fall or injury or trauma. Denies numbness/tingling. Denies fever/chills. Denies pain/injury elsewhere. Pt seen in ED along with Orthopedic Team/Residents/PAs.     ROS: No CP, no SOB, No night sweats, no fevers or chills, no Numbness or tingling, No anticoagulants    MEWS Score    fall    SysAdmin_VisitLink      PAST MEDICAL & SURGICAL HISTORY:    SH: Lives at son's home with several family members right now. Walks 2mi daily. Non smoker non drinker      MEDICATIONS  (STANDING):    Allergies    No Known Allergies    Intolerances                            13.6   8.02  )-----------( 168      ( 29 Oct 2024 15:28 )             40.8     29 Oct 2024 15:28    140    |  108    |  12     ----------------------------<  105    3.7     |  28     |  0.63     Ca    9.6        29 Oct 2024 15:28    TPro  7.5    /  Alb  3.8    /  TBili  0.3    /  DBili  x      /  AST  19     /  ALT  28     /  AlkPhos  109    29 Oct 2024 15:28    PT/INR - ( 29 Oct 2024 15:28 )   PT: 10.8 sec;   INR: 0.91 ratio         PTT - ( 29 Oct 2024 15:28 )  PTT:28.7 sec  Vital Signs Last 24 Hrs  T(C): 36.6 (10-29-24 @ 14:20), Max: 36.6 (10-29-24 @ 14:20)  T(F): 97.9 (10-29-24 @ 14:20), Max: 97.9 (10-29-24 @ 14:20)  HR: 88 (10-29-24 @ 14:20) (88 - 88)  BP: 129/67 (10-29-24 @ 14:20) (129/67 - 129/67)  BP(mean): 81 (10-29-24 @ 14:20) (81 - 81)  RR: 18 (10-29-24 @ 14:20) (18 - 18)  SpO2: 99% (10-29-24 @ 14:20) (99% - 99%)    Imaging: XR Hip/Pelvis negative for fracture. Bony CT Pelvis Negative for fracture**    Physical Exam  General: NAD, Alert, Awake and oriented calm  Neurologic: No gross deficits, moving all 4s.  Head: NCAT without abrasions, lacerations, or ecchymosis to head, face, or scalp  Eyes: PERRL  Neck/C-Spine: FAROM. No bony TTP.  T/L Spine: No bony TTP, no palpable step-off.        HIPS and PELVIS: Able to SLR both hips but right is sluggish due to pain Hip.     RIGHT LE:  + TTP over GT. No open skin. No deformities or other signs of trauma at hip, knee, lower leg, ankle or foot. Full baseline painless ROM at Hip, Knee, ankle and toes. Negative log-roll and heel strike. No pain on axial loading. No groin or hip  pain on Passive internal or external rotation Able to actively SLR. SILT toes 1-5. + DP/PT pulses palpable with brisk cap refill distally. Compartments soft and compressible. EHL/FHL/TA/GS intact symmetric bilaterally.    LEFT LE:  No open skin. No deformities or other signs of trauma at hip, knee, lower leg, ankle or foot. Full baseline painless ROM at Hip, Knee, ankle and toes. Negative log-roll and heel strike. No pain on axial loading. No groin or hip  pain on Passive internal or external rotation Able to actively SLR. SILT toes 1-5. + DP/PT pulses palpable with brisk cap refill distally. Compartments soft and compressible. EHL/FHL/TA/GS intact symmetric bilaterally.    RIGHT UE: No open skin. No obvious deformities or other signs of trauma at shoulder, upper arm, elbow, forearm, wrist or hand.  Full baseline painless ROM at shoulder, elbow, wrist, and . No bony TTP. Compartments soft and compressible.     LEFT UE: No open skin. No obvious deformities or other signs of trauma at shoulder, upper arm, elbow, forearm, wrist or hand.  Full baseline painless ROM at shoulder, elbow, wrist, and . No bony TTP. Compartments soft and compressible.     **Assisted Walking exam is guarded. She cannot put full weight on right leg due to hip pain      A/P: 78y Female with Right greater trochanteric fracture, traumatic hip pain  -MRI right hip eval for extension  -NWB for now  -Pain control  -DVT PE ppx  -Spoke with pt and son regarding the fx including possibility of surgery if it extends  -Discussed with Orthopedic Attending Dr. Alegria at [phone]   78y Female community ambulator no assistive devices presents c/o Right hip pain. She fell in her home yesterday evening in the kitchen as she got up from her chair and her foot got caught. She fell onto he right hip and shoulder, Her son was there as well as other family who helped her to her feet. She was initially able to bear a little weight but limping badly. SHe got to bed and slept the night. Today she was not able to put full weight on the right leg so her son brought her in. XR and CT today show a right GT fx. MRI is ordered and pending. No alleviating factors such as medication or positioning. Denies fall or injury or trauma. Denies numbness/tingling. Denies fever/chills. Denies pain/injury elsewhere. Pt seen in ED along with Orthopedic Team/Residents/PAs.     ROS: No CP, no SOB, No night sweats, no fevers or chills, no Numbness or tingling, No anticoagulants    MEWS Score    fall    SysAdmin_VisitLink      PAST MEDICAL & SURGICAL HISTORY:    SH: Lives at son's home with several family members right now. Walks 2mi daily. Non smoker non drinker      MEDICATIONS  (STANDING):    Allergies    No Known Allergies    Intolerances                            13.6   8.02  )-----------( 168      ( 29 Oct 2024 15:28 )             40.8     29 Oct 2024 15:28    140    |  108    |  12     ----------------------------<  105    3.7     |  28     |  0.63     Ca    9.6        29 Oct 2024 15:28    TPro  7.5    /  Alb  3.8    /  TBili  0.3    /  DBili  x      /  AST  19     /  ALT  28     /  AlkPhos  109    29 Oct 2024 15:28    PT/INR - ( 29 Oct 2024 15:28 )   PT: 10.8 sec;   INR: 0.91 ratio         PTT - ( 29 Oct 2024 15:28 )  PTT:28.7 sec  Vital Signs Last 24 Hrs  T(C): 36.6 (10-29-24 @ 14:20), Max: 36.6 (10-29-24 @ 14:20)  T(F): 97.9 (10-29-24 @ 14:20), Max: 97.9 (10-29-24 @ 14:20)  HR: 88 (10-29-24 @ 14:20) (88 - 88)  BP: 129/67 (10-29-24 @ 14:20) (129/67 - 129/67)  BP(mean): 81 (10-29-24 @ 14:20) (81 - 81)  RR: 18 (10-29-24 @ 14:20) (18 - 18)  SpO2: 99% (10-29-24 @ 14:20) (99% - 99%)    Imaging: per HPI  < from: CT Pelvis Bony Only No Cont (10.29.24 @ 15:50) >  IMPRESSION:  1.  Slightly displaced transverse fracture involves the superior margin   of the right greater trochanter.  2.  MRI may be helpful for evaluation of intertrochanteric extension as   warranted.    < end of copied text >      Physical Exam  General: NAD, Alert, Awake and oriented calm  Neurologic: No gross deficits, moving all 4s.  Head: NCAT without abrasions, lacerations, or ecchymosis to head, face, or scalp  Eyes: PERRL  Neck/C-Spine: FAROM. No bony TTP.  T/L Spine: No bony TTP, no palpable step-off.        HIPS and PELVIS: Able to SLR both hips but right is sluggish due to pain Hip.     RIGHT LE:  + TTP over GT. No open skin. No deformities or other signs of trauma at hip, knee, lower leg, ankle or foot. Full baseline painless ROM at Hip, Knee, ankle and toes. Negative log-roll and heel strike. No pain on axial loading. No groin or hip  pain on Passive internal or external rotation Able to actively SLR. SILT toes 1-5. + DP/PT pulses palpable with brisk cap refill distally. Compartments soft and compressible. EHL/FHL/TA/GS intact symmetric bilaterally.    LEFT LE:  No open skin. No deformities or other signs of trauma at hip, knee, lower leg, ankle or foot. Full baseline painless ROM at Hip, Knee, ankle and toes. Negative log-roll and heel strike. No pain on axial loading. No groin or hip  pain on Passive internal or external rotation Able to actively SLR. SILT toes 1-5. + DP/PT pulses palpable with brisk cap refill distally. Compartments soft and compressible. EHL/FHL/TA/GS intact symmetric bilaterally.    RIGHT UE: No open skin. No obvious deformities or other signs of trauma at shoulder, upper arm, elbow, forearm, wrist or hand.  Full baseline painless ROM at shoulder, elbow, wrist, and . No bony TTP. Compartments soft and compressible.     LEFT UE: No open skin. No obvious deformities or other signs of trauma at shoulder, upper arm, elbow, forearm, wrist or hand.  Full baseline painless ROM at shoulder, elbow, wrist, and . No bony TTP. Compartments soft and compressible.     **Assisted Walking exam is guarded. She cannot put full weight on right leg due to hip pain      A/P: 78y Female with Right greater trochanteric fracture, traumatic hip pain  -MRI right hip eval for extension  -NWB for now  -Pain control  -DVT PE ppx  -Spoke with pt and son regarding the fx including possibility of surgery if it extends  -Discussed with Orthopedic Attending Dr. Alegria at [phone]   78y Female community ambulator no assistive devices presents c/o Right hip pain. She fell in her home yesterday evening in the kitchen as she got up from her chair and her foot got caught. She fell onto he right hip and shoulder, Her son was there as well as other family who helped her to her feet. She was initially able to bear a little weight but limping badly. She got to bed and slept the night. Today she was not able to put full weight on the right leg so her son brought her in. XR and CT today show a right GT fx. MRI is ordered and pending. No alleviating factors such as medication or positioning. Denies fall or injury or trauma. Denies numbness/tingling. Denies fever/chills. Denies pain/injury elsewhere. Pt seen in ED along with Orthopedic Team/Residents/PAs.     ROS: No CP, no SOB, No night sweats, no fevers or chills, no Numbness or tingling, No anticoagulants    MEWS Score    fall    SysAdmin_VisitLink      PAST MEDICAL & SURGICAL HISTORY:    SH: Lives at son's home with several family members right now. Walks 2mi daily. Non smoker non drinker      MEDICATIONS  (STANDING):    Allergies    No Known Allergies    Intolerances                            13.6   8.02  )-----------( 168      ( 29 Oct 2024 15:28 )             40.8     29 Oct 2024 15:28    140    |  108    |  12     ----------------------------<  105    3.7     |  28     |  0.63     Ca    9.6        29 Oct 2024 15:28    TPro  7.5    /  Alb  3.8    /  TBili  0.3    /  DBili  x      /  AST  19     /  ALT  28     /  AlkPhos  109    29 Oct 2024 15:28    PT/INR - ( 29 Oct 2024 15:28 )   PT: 10.8 sec;   INR: 0.91 ratio         PTT - ( 29 Oct 2024 15:28 )  PTT:28.7 sec  Vital Signs Last 24 Hrs  T(C): 36.6 (10-29-24 @ 14:20), Max: 36.6 (10-29-24 @ 14:20)  T(F): 97.9 (10-29-24 @ 14:20), Max: 97.9 (10-29-24 @ 14:20)  HR: 88 (10-29-24 @ 14:20) (88 - 88)  BP: 129/67 (10-29-24 @ 14:20) (129/67 - 129/67)  BP(mean): 81 (10-29-24 @ 14:20) (81 - 81)  RR: 18 (10-29-24 @ 14:20) (18 - 18)  SpO2: 99% (10-29-24 @ 14:20) (99% - 99%)    Imaging: per HPI  < from: CT Pelvis Bony Only No Cont (10.29.24 @ 15:50) >  IMPRESSION:  1.  Slightly displaced transverse fracture involves the superior margin   of the right greater trochanter.  2.  MRI may be helpful for evaluation of intertrochanteric extension as   warranted.    < end of copied text >      Physical Exam  General: NAD, Alert, Awake and oriented calm  Neurologic: No gross deficits, moving all 4s.  Head: NCAT without abrasions, lacerations, or ecchymosis to head, face, or scalp  Eyes: PERRL  Neck/C-Spine: FAROM. No bony TTP.  T/L Spine: No bony TTP, no palpable step-off.        HIPS and PELVIS: Able to SLR both hips but right is sluggish due to pain Hip.     RIGHT LE:  + TTP over GT. No open skin. No deformities or other signs of trauma at hip, knee, lower leg, ankle or foot. Full baseline painless ROM at Hip, Knee, ankle and toes. Negative log-roll and heel strike. No pain on axial loading. No groin or hip  pain on Passive internal or external rotation Able to actively SLR. SILT toes 1-5. + DP/PT pulses palpable with brisk cap refill distally. Compartments soft and compressible. EHL/FHL/TA/GS intact symmetric bilaterally.    LEFT LE:  No open skin. No deformities or other signs of trauma at hip, knee, lower leg, ankle or foot. Full baseline painless ROM at Hip, Knee, ankle and toes. Negative log-roll and heel strike. No pain on axial loading. No groin or hip  pain on Passive internal or external rotation Able to actively SLR. SILT toes 1-5. + DP/PT pulses palpable with brisk cap refill distally. Compartments soft and compressible. EHL/FHL/TA/GS intact symmetric bilaterally.    RIGHT UE: No open skin. No obvious deformities or other signs of trauma at shoulder, upper arm, elbow, forearm, wrist or hand.  Full baseline painless ROM at shoulder, elbow, wrist, and . No bony TTP. Compartments soft and compressible.     LEFT UE: No open skin. No obvious deformities or other signs of trauma at shoulder, upper arm, elbow, forearm, wrist or hand.  Full baseline painless ROM at shoulder, elbow, wrist, and . No bony TTP. Compartments soft and compressible.     **Assisted Walking exam is guarded. She cannot put full weight on right leg due to hip pain      A/P: 78y Female with Right greater trochanteric fracture, traumatic hip pain  -MRI right hip eval for extension  -NWB for now  -Pain control  -DVT PE ppx  -Spoke with pt and son regarding the fx including possibility of surgery if it extends  -Discussed with Orthopedic Attending Dr. Alegria at [phone]

## 2024-10-29 NOTE — ED ADULT TRIAGE NOTE - CHIEF COMPLAINT QUOTE
brought in by son for right sided pain, especially to groin area as per son. patient sustained a witnessed fall last night, was at the dinner table got up to get something and tripped over leg on chair, falling onto her right side. + head strike, son reports there is a bruise to her thigh. patient is normally ambulatory at baseline without assistive device and now cannot stand up 2/2 pain. patient is not on AC therapy. patient is at baseline mental status.

## 2024-10-29 NOTE — CONSULT NOTE ADULT - ASSESSMENT
Addendum:  Reviewed MRI with Dr Alegria. No obvious fracture that extends to lesser. Appears to be isolated GT fx  -TTWB with rolling walker  -PT  -No surgery  -Reconsult as needed  -Updated Pt and ED   < from: MR Pelvis Bony Only No Cont (10.29.24 @ 19:22) >  IMPRESSION: Acute right greater trochanteric fracture with   intertrochanteric extension.    < end of copied text >      In Discussion with Attending re: MRI result < from: MR Pelvis Bony Only No Cont (10.29.24 @ 19:22) >  IMPRESSION: Acute right greater trochanteric fracture with   intertrochanteric extension.    < end of copied text >      In Discussion with Attending re: MRI result    Discussed with Dr Alegria who reviewed MRI does not feel it extends fully.  Pt can begin PT TTWB with rolling walker  I discussed in detail with son and pt at bedside  Reconsult as needed if anything changes or if pain becomes significantly worse  DVT PE ppx  Diet  analgesia prn

## 2024-10-29 NOTE — ED PROVIDER NOTE - CLINICAL SUMMARY MEDICAL DECISION MAKING FREE TEXT BOX
pt with hx htn who tripped over chair leg last pm onto right hip. Xrays ortho consult. pt states unable to ambulate prob admission., ct. hip

## 2024-10-29 NOTE — ED ADULT NURSE NOTE - NSFALLRISKINTERV_ED_ALL_ED

## 2024-10-29 NOTE — ED PROVIDER NOTE - OBJECTIVE STATEMENT
pt is a 79 yo wf with hx htn thyroid disease who tripped over leg of chair last pm onto her right hip. Pt was picked up from floor by son. No head injury. pt is a 77 yo wf with hx htn thyroid disease who tripped over leg of chair last pm onto her right hip. Pt was picked up from floor by son. No head injury.c/o pain in right hip. Unable to ambulate.

## 2024-10-30 ENCOUNTER — TRANSCRIPTION ENCOUNTER (OUTPATIENT)
Age: 79
End: 2024-10-30

## 2024-10-30 LAB — 24R-OH-CALCIDIOL SERPL-MCNC: 24.4 NG/ML — LOW (ref 30–80)

## 2024-10-30 PROCEDURE — 99233 SBSQ HOSP IP/OBS HIGH 50: CPT

## 2024-10-30 RX ORDER — OXYCODONE HYDROCHLORIDE 30 MG/1
5 TABLET ORAL EVERY 4 HOURS
Refills: 0 | Status: DISCONTINUED | OUTPATIENT
Start: 2024-10-30 | End: 2024-10-31

## 2024-10-30 RX ORDER — CELECOXIB 100 MG
100 CAPSULE ORAL EVERY 12 HOURS
Refills: 0 | Status: DISCONTINUED | OUTPATIENT
Start: 2024-10-30 | End: 2024-10-31

## 2024-10-30 RX ORDER — OXYCODONE HYDROCHLORIDE 30 MG/1
2.5 TABLET ORAL EVERY 4 HOURS
Refills: 0 | Status: DISCONTINUED | OUTPATIENT
Start: 2024-10-30 | End: 2024-10-31

## 2024-10-30 RX ORDER — ACETAMINOPHEN 500 MG
1000 TABLET ORAL EVERY 8 HOURS
Refills: 0 | Status: DISCONTINUED | OUTPATIENT
Start: 2024-10-30 | End: 2024-10-31

## 2024-10-30 RX ORDER — ACETAMINOPHEN 500 MG
650 TABLET ORAL EVERY 6 HOURS
Refills: 0 | Status: DISCONTINUED | OUTPATIENT
Start: 2024-10-30 | End: 2024-10-30

## 2024-10-30 RX ADMIN — Medication 5 MILLIGRAM(S): at 09:29

## 2024-10-30 RX ADMIN — Medication 1000 MILLIGRAM(S): at 21:05

## 2024-10-30 RX ADMIN — Medication 650 MILLIGRAM(S): at 11:40

## 2024-10-30 RX ADMIN — Medication 50 MICROGRAM(S): at 05:44

## 2024-10-30 RX ADMIN — Medication 40 MILLIGRAM(S): at 05:38

## 2024-10-30 RX ADMIN — Medication 100 MILLIGRAM(S): at 21:05

## 2024-10-30 RX ADMIN — Medication 650 MILLIGRAM(S): at 10:49

## 2024-10-30 NOTE — PHYSICAL THERAPY INITIAL EVALUATION ADULT - ASSISTIVE DEVICE FOR STAIR TRANSFER, REHAB EVAL
GROUP THERAPY PROGRESS NOTE Renetta Neri is participating in nutrition group. Group time: 30 minutes Personal goal for participation: to talk about proper eating habits Goal orientation: community Group therapy participation: active Therapeutic interventions reviewed and discussed: staff talked to pts about healthy eating Impression of participation: pt really enjoyed herself bilateral rails

## 2024-10-30 NOTE — PHYSICAL THERAPY INITIAL EVALUATION ADULT - PERTINENT HX OF CURRENT PROBLEM, REHAB EVAL
79 y/o F w/ PMH of HTN, hypothyroidism, glaucoma, dyslipidemia, p/w mechanical fall. Patient is [place]-speaking and prefers her son at bedside to translate. Son states that last night patient was eating dinner at the dinner table, and when she went to got up her foot got caught on the chair's leg and she fell on her side. The patient denies hitting her head. Patient was unable to get up at the time and the son lifted her off the ground and put her on a chair. Patient did not want to go to the emergency room last night. This morning when she woke up her pain had gotten worse, so she came to the ED. patient is currently eating dinner at bedside and is comfortable when she's laying still.  -CT: Slightly displaced transverse fracture involves the superior margin of the right greater trochanter  -MRI: Acute right greater trochanteric fracture with   intertrochanteric extension.  -As per ortho, patient to have non-operative management as this time.

## 2024-10-30 NOTE — PROGRESS NOTE ADULT - SUBJECTIVE AND OBJECTIVE BOX
History of Present Illness:  Reason for Admission: mechanical fall  History of Present Illness:   79 y/o F w/ PMH of HTN, hypothyroidism, glaucoma, dyslipidemia, p/w mechanical fall. Patient is Italian-speaking and prefers her son at bedside to translate. Son states that last night patient was eating dinner at the dinner table, and when she went to got up her foot got caught on the chair's leg and she fell on her side. The patient denies hitting her head. Patient was unable to get up at the time and the son lifted her off the ground and put her on a chair. Patient did not want to go to the emergency room last night. This morning when she woke up her pain had gotten worse, so she came to the ED. patient is currently eating dinner at bedside and is comfortable when she's laying still.  Denies LOC, CP, SOB, cough, runny nose, sore throat, nausea, vomiting, abdominal pain, fever, chills.     10/30 - got OOB to chair with PT.   used. right hip only with ambulating    ROS:   All 10 systems reviewed and found to be negative with the exception of what has been described above.    Vital Signs Last 24 Hrs  T(C): 36.4 (30 Oct 2024 07:56), Max: 36.7 (29 Oct 2024 23:29)  T(F): 97.6 (30 Oct 2024 07:56), Max: 98 (29 Oct 2024 23:29)  HR: 67 (30 Oct 2024 07:56) (67 - 88)  BP: 130/67 (30 Oct 2024 07:56) (104/66 - 145/75)  BP(mean): 73 (30 Oct 2024 01:04) (73 - 84)  RR: 16 (30 Oct 2024 07:56) (16 - 19)  SpO2: 95% (30 Oct 2024 07:56) (94% - 99%)    Parameters below as of 30 Oct 2024 07:56  Patient On (Oxygen Delivery Method): room air    GEN: lying in bed, NAD  HEENT:   NC/AT, pupils equal and reactive, EOMI  CV:  +S1, +S2, RRR  RESP:   lungs clear to auscultation bilaterally, no wheeze, rales, rhonchi   BREAST:  not examined  GI:  abdomen soft, non-tender, non-distended, normoactive BS  RECTAL:  not examined  :  not examined  MSK:   normal muscle tone  EXT: right hip TTP   NEURO:  AAOX3, no focal neurological deficits  SKIN:  no rashes                              13.6   6.67  )-----------( 154      ( 29 Oct 2024 22:59 )             40.6     10-29    142  |  110[H]  |  14  ----------------------------<  117[H]  3.7   |  27  |  0.66    Ca    9.2      29 Oct 2024 22:59  Phos  3.5     10-29  Mg     2.2     10-29    TPro  7.2  /  Alb  3.6  /  TBili  0.4  /  DBili  x   /  AST  14[L]  /  ALT  26  /  AlkPhos  94  10-29        LIVER FUNCTIONS - ( 29 Oct 2024 22:59 )  Alb: 3.6 g/dL / Pro: 7.2 gm/dL / ALK PHOS: 94 U/L / ALT: 26 U/L / AST: 14 U/L / GGT: x           PT/INR - ( 29 Oct 2024 22:59 )   PT: 11.2 sec;   INR: 0.95 ratio         PTT - ( 29 Oct 2024 22:59 )  PTT:28.8 sec  Urinalysis Basic - ( 29 Oct 2024 22:59 )    Color: x / Appearance: x / SG: x / pH: x  Gluc: 117 mg/dL / Ketone: x  / Bili: x / Urobili: x   Blood: x / Protein: x / Nitrite: x   Leuk Esterase: x / RBC: x / WBC x   Sq Epi: x / Non Sq Epi: x / Bacteria: x      < from: MR Pelvis Bony Only No Cont (10.29.24 @ 19:22) >    IMPRESSION: Acute right greater trochanteric fracture with   intertrochanteric extension.    < end of copied text >         Assessment:  · Assessment	  79 y/o F w/ PMH of HTN, hypothyroidism, glaucoma, dyslipidemia, p/w mechanical fall.      *R greater trochanteric fracture s/p mechanical fall   -CT: Slightly displaced transverse fracture involves the superior margin of the right greater trochanter.  -MRI: Acute right greater trochanteric fracture with   intertrochanteric extension.  -As per ortho, patient to have non-operative management as this time.   -Pain meds prn  -EKG: NSR 70 bpm    - f/u PT eval      *H/o hypothyroidism / glaucoma / dyslipidemia   -C/w home meds     *DVT ppx  -Lovenox   
None known

## 2024-10-30 NOTE — PATIENT PROFILE ADULT - FALL HARM RISK - HARM RISK INTERVENTIONS

## 2024-10-30 NOTE — PHARMACOTHERAPY INTERVENTION NOTE - COMMENTS
Medication history complete, reviewed medications with patient and son at bedside and confirmed with doctor first med hx.
Patient admitted with fall - greater tochanter fracture nonop - pain meds ordered as Acetaminophen 650mg q6h PRN mild pain and morphine 2mg IV q4h PRN moderate pain.  Orders changed to include multimodal pain management:  Postop pain orders:  Acetaminophen 1000mg PO q8h ATC  Celecoxib 100mg q12h ATC  No opioid PRN mild pain  Oxycodone 2.5mg PO q4h PRN moderate pain and 5mg PO q4h PRN severe pain  
Patient's weight >50 kg; changed order from 650 mg IV acetaminophen to 1000 mg. MD accepted.

## 2024-10-30 NOTE — PHYSICAL THERAPY INITIAL EVALUATION ADULT - ADDITIONAL COMMENTS
The pt is typically very independent, lives with family but most family members are at work during the day.

## 2024-10-30 NOTE — PHYSICAL THERAPY INITIAL EVALUATION ADULT - WEIGHT-BEARING RESTRICTIONS: STAND/SIT, REHAB EVAL
toe touch weight-bearing Patient with one or more new problems requiring additional work-up/treatment.

## 2024-10-30 NOTE — DISCHARGE NOTE NURSING/CASE MANAGEMENT/SOCIAL WORK - PATIENT PORTAL LINK FT
You can access the FollowMyHealth Patient Portal offered by [facility] by registering at the following website: [email or web address]. By joining [facility]’s FollowMyHealth portal, you will also be able to view your health information using other applications (apps) compatible with our system.

## 2024-10-30 NOTE — DISCHARGE NOTE NURSING/CASE MANAGEMENT/SOCIAL WORK - FINANCIAL ASSISTANCE
[facility] provides services at a reduced cost to those who are determined to be eligible through [facility]’s financial assistance program. Information regarding [facility]’s financial assistance program can be found by going to [email or web address] or by calling [phone].

## 2024-10-30 NOTE — PHYSICAL THERAPY INITIAL EVALUATION ADULT - NSPTDMEREC_GEN_A_CORE
The pt will benefit from the use of a RW to aide in Mobility Related ADLS due to having a dx of Right greater trochanteric hip fx, TTWB- Conservative management./rolling walker

## 2024-10-31 ENCOUNTER — TRANSCRIPTION ENCOUNTER (OUTPATIENT)
Age: 79
End: 2024-10-31

## 2024-10-31 VITALS
SYSTOLIC BLOOD PRESSURE: 137 MMHG | HEART RATE: 64 BPM | RESPIRATION RATE: 17 BRPM | DIASTOLIC BLOOD PRESSURE: 65 MMHG | OXYGEN SATURATION: 94 % | TEMPERATURE: 97 F

## 2024-10-31 PROCEDURE — 99239 HOSP IP/OBS DSCHRG MGMT >30: CPT

## 2024-10-31 RX ORDER — ACETAMINOPHEN 500 MG
2 TABLET ORAL
Qty: 84 | Refills: 0
Start: 2024-10-31 | End: 2024-11-13

## 2024-10-31 RX ORDER — ENOXAPARIN SODIUM 40MG/0.4ML
40 SYRINGE (ML) SUBCUTANEOUS
Qty: 1 | Refills: 0
Start: 2024-10-31 | End: 2024-11-11

## 2024-10-31 RX ORDER — ASPIRIN/MAG CARB/ALUMINUM AMIN 325 MG
1 TABLET ORAL
Qty: 28 | Refills: 0
Start: 2024-10-31 | End: 2024-11-13

## 2024-10-31 RX ORDER — CELECOXIB 100 MG
1 CAPSULE ORAL
Qty: 14 | Refills: 0
Start: 2024-10-31 | End: 2024-11-06

## 2024-10-31 RX ADMIN — Medication 1000 MILLIGRAM(S): at 13:12

## 2024-10-31 RX ADMIN — Medication 1000 MILLIGRAM(S): at 06:16

## 2024-10-31 RX ADMIN — Medication 5 MILLIGRAM(S): at 10:49

## 2024-10-31 RX ADMIN — Medication 100 MILLIGRAM(S): at 10:49

## 2024-10-31 RX ADMIN — Medication 50 MICROGRAM(S): at 06:16

## 2024-10-31 RX ADMIN — Medication 40 MILLIGRAM(S): at 06:16

## 2024-10-31 NOTE — DISCHARGE NOTE PROVIDER - HOSPITAL COURSE
History of Present Illness:  Reason for Admission: mechanical fall  History of Present Illness:   77 y/o F w/ PMH of HTN, hypothyroidism, glaucoma, dyslipidemia, p/w mechanical fall. Patient is Bangladeshi-speaking and prefers her son at bedside to translate. Son states that last night patient was eating dinner at the dinner table, and when she went to got up her foot got caught on the chair's leg and she fell on her side. The patient denies hitting her head. Patient was unable to get up at the time and the son lifted her off the ground and put her on a chair. Patient did not want to go to the emergency room last night. This morning when she woke up her pain had gotten worse, so she came to the ED. patient is currently eating dinner at bedside and is comfortable when she's laying still.  Denies LOC, CP, SOB, cough, runny nose, sore throat, nausea, vomiting, abdominal pain, fever, chills.     10/30 - got OOB to chair with PT.   used. right hip only with ambulating  10/31 - ambulated with PT. pain controlled with tylenol         GEN: lying in bed, NAD  HEENT:   NC/AT, pupils equal and reactive, EOMI  CV:  +S1, +S2, RRR  RESP:   lungs clear to auscultation bilaterally, no wheeze, rales, rhonchi   BREAST:  not examined  GI:  abdomen soft, non-tender, non-distended, normoactive BS  RECTAL:  not examined  :  not examined  MSK:   normal muscle tone  EXT: right hip TTP   NEURO:  AAOX3, no focal neurological deficits  SKIN:  no rashes        < from: MR Pelvis Bony Only No Cont (10.29.24 @ 19:22) >    IMPRESSION: Acute right greater trochanteric fracture with   intertrochanteric extension.    *R greater trochanteric fracture s/p mechanical fall   -CT: Slightly displaced transverse fracture involves the superior margin of the right greater trochanter.  -MRI: Acute right greater trochanteric fracture with   intertrochanteric extension.  -As per ortho, patient to have non-operative management as this time.   -Pain meds prn -managed with tylenol   -EKG: NSR 70 bpm    - f/u PT eval  will dc home with home PT    *H/o hypothyroidism / glaucoma / dyslipidemia   -C/w home meds     *DVT ppx  -Lovenox X 2 weeks then aspirin 81 mg bid X 2 weeks

## 2024-10-31 NOTE — DISCHARGE NOTE PROVIDER - NSDCCPCAREPLAN_GEN_ALL_CORE_FT
PRINCIPAL DISCHARGE DIAGNOSIS  Diagnosis: Nondisplaced fracture of greater trochanter of right femur, initial encounter for closed fracture  Assessment and Plan of Treatment: please follow up with orthopedic surgery  home PT has been set up for you

## 2024-10-31 NOTE — DISCHARGE NOTE PROVIDER - NSDCMRMEDTOKEN_GEN_ALL_CORE_FT
acetaminophen 500 mg oral tablet: 2 tab(s) orally every 8 hours  amLODIPine 5 mg oral tablet: 1 tab(s) orally once a day  aspirin 81 mg oral tablet, chewable: 1 tab(s) chewed 2 times a day take it for 2 weeks AFTER you complete lovenox  Benadryl 25 mg oral tablet: 1 tab(s) orally once a day as needed for  allergy symptoms  celecoxib 100 mg oral capsule: 1 cap(s) orally every 12 hours  levothyroxine 50 mcg (0.05 mg) oral tablet: 1 tab(s) orally once a day  Lovenox 40 mg/0.4 mL injectable solution: 40 milligram(s) subcutaneously once a day  triamcinolone 0.025% topical cream: Apply topically to affected area once a day

## 2024-11-01 ENCOUNTER — APPOINTMENT (OUTPATIENT)
Dept: CARE COORDINATION | Facility: HOME HEALTH | Age: 79
End: 2024-11-01

## 2024-11-01 ENCOUNTER — TRANSCRIPTION ENCOUNTER (OUTPATIENT)
Age: 79
End: 2024-11-01

## 2024-11-01 DIAGNOSIS — Z86.39 PERSONAL HISTORY OF OTHER ENDOCRINE, NUTRITIONAL AND METABOLIC DISEASE: Chronic | ICD-10-CM

## 2024-11-01 DIAGNOSIS — S72.114S: ICD-10-CM

## 2024-11-01 DIAGNOSIS — I10 ESSENTIAL (PRIMARY) HYPERTENSION: ICD-10-CM

## 2024-11-01 PROCEDURE — 99347 HOME/RES VST EST SF MDM 20: CPT | Mod: 95

## 2024-11-04 ENCOUNTER — TRANSCRIPTION ENCOUNTER (OUTPATIENT)
Age: 79
End: 2024-11-04

## 2024-11-04 PROBLEM — I10 ESSENTIAL (PRIMARY) HYPERTENSION: Chronic | Status: ACTIVE | Noted: 2024-11-01

## 2024-11-05 ENCOUNTER — TRANSCRIPTION ENCOUNTER (OUTPATIENT)
Age: 79
End: 2024-11-05

## 2024-11-05 ENCOUNTER — APPOINTMENT (OUTPATIENT)
Dept: ORTHOPEDIC SURGERY | Facility: CLINIC | Age: 79
End: 2024-11-05

## 2024-11-06 ENCOUNTER — TRANSCRIPTION ENCOUNTER (OUTPATIENT)
Age: 79
End: 2024-11-06
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
## 2024-11-08 DIAGNOSIS — E78.5 HYPERLIPIDEMIA, UNSPECIFIED: ICD-10-CM

## 2024-11-08 DIAGNOSIS — Z79.890 HORMONE REPLACEMENT THERAPY: ICD-10-CM

## 2024-11-08 DIAGNOSIS — E03.9 HYPOTHYROIDISM, UNSPECIFIED: ICD-10-CM

## 2024-11-08 DIAGNOSIS — I10 ESSENTIAL (PRIMARY) HYPERTENSION: ICD-10-CM

## 2024-11-08 DIAGNOSIS — S72.114A NONDISPLACED FRACTURE OF GREATER TROCHANTER OF RIGHT FEMUR, INITIAL ENCOUNTER FOR CLOSED FRACTURE: ICD-10-CM
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
## 2024-11-08 DIAGNOSIS — H40.9 UNSPECIFIED GLAUCOMA: ICD-10-CM

## 2024-11-08 PROBLEM — S72.114S: Status: ACTIVE | Noted: 2024-11-08

## 2024-11-08 RX ORDER — ENOXAPARIN SODIUM 40 MG/.4ML
40 INJECTION SUBCUTANEOUS
Refills: 0 | Status: ACTIVE | COMMUNITY

## 2024-11-08 RX ORDER — TRIAMCINOLONE ACETONIDE 0.25 MG/G
0.03 CREAM TOPICAL 3 TIMES DAILY
Refills: 0 | Status: ACTIVE | COMMUNITY

## 2024-11-08 RX ORDER — ASPIRIN ENTERIC COATED TABLETS 81 MG 81 MG/1
81 TABLET, DELAYED RELEASE ORAL DAILY
Qty: 90 | Refills: 3 | Status: ACTIVE | COMMUNITY

## 2024-11-08 RX ORDER — CELECOXIB 100 MG/1
100 CAPSULE ORAL TWICE DAILY
Refills: 0 | Status: ACTIVE | COMMUNITY

## 2024-11-08 RX ORDER — ACETAMINOPHEN 500 MG/1
500 TABLET ORAL
Qty: 42 | Refills: 0 | Status: ACTIVE | COMMUNITY

## 2024-11-13 ENCOUNTER — TRANSCRIPTION ENCOUNTER (OUTPATIENT)
Age: 79
End: 2024-11-13

## 2024-11-13 ENCOUNTER — APPOINTMENT (OUTPATIENT)
Dept: ORTHOPEDIC SURGERY | Facility: CLINIC | Age: 79
End: 2024-11-13
Payer: MEDICARE

## 2024-11-13 VITALS
BODY MASS INDEX: 25.6 KG/M2 | DIASTOLIC BLOOD PRESSURE: 79 MMHG | HEIGHT: 59 IN | HEART RATE: 69 BPM | SYSTOLIC BLOOD PRESSURE: 145 MMHG | WEIGHT: 127 LBS

## 2024-11-13 DIAGNOSIS — S72.114S: ICD-10-CM

## 2024-11-13 PROCEDURE — 99213 OFFICE O/P EST LOW 20 MIN: CPT | Mod: 57

## 2024-11-13 PROCEDURE — 27238 TREAT THIGH FRACTURE: CPT | Mod: RT

## 2024-11-20 ENCOUNTER — TRANSCRIPTION ENCOUNTER (OUTPATIENT)
Age: 79
End: 2024-11-20

## 2024-11-27 ENCOUNTER — TRANSCRIPTION ENCOUNTER (OUTPATIENT)
Age: 79
End: 2024-11-27

## 2025-05-06 ENCOUNTER — OFFICE (OUTPATIENT)
Dept: URBAN - METROPOLITAN AREA CLINIC 6 | Facility: CLINIC | Age: 80
Setting detail: OPHTHALMOLOGY
End: 2025-05-06
Payer: COMMERCIAL

## 2025-05-06 DIAGNOSIS — H25.13: ICD-10-CM

## 2025-05-06 DIAGNOSIS — H40.013: ICD-10-CM

## 2025-05-06 DIAGNOSIS — H35.371: ICD-10-CM

## 2025-05-06 PROCEDURE — 92250 FUNDUS PHOTOGRAPHY W/I&R: CPT | Performed by: OPHTHALMOLOGY

## 2025-05-06 PROCEDURE — 92014 COMPRE OPH EXAM EST PT 1/>: CPT | Performed by: OPHTHALMOLOGY

## 2025-05-06 ASSESSMENT — REFRACTION_CURRENTRX
OS_CYLINDER: -0.50
OS_ADD: +2.75
OD_ADD: +2.75
OS_OVR_VA: 20/
OD_SPHERE: +2.00
OD_CYLINDER: -2.25
OS_SPHERE: +2.00
OD_OVR_VA: 20/
OD_AXIS: 012
OS_AXIS: 114

## 2025-05-06 ASSESSMENT — REFRACTION_AUTOREFRACTION
OD_AXIS: 021
OS_CYLINDER: -1.00
OS_AXIS: 108
OD_SPHERE: +1.00
OD_CYLINDER: -1.75
OS_SPHERE: +2.25

## 2025-05-06 ASSESSMENT — KERATOMETRY
OD_K1POWER_DIOPTERS: 42.25
OS_K1POWER_DIOPTERS: 43.00
OD_K2POWER_DIOPTERS: 44.50
OD_AXISANGLE_DEGREES: 100
OS_AXISANGLE_DEGREES: 087
OS_K2POWER_DIOPTERS: 44.00

## 2025-05-06 ASSESSMENT — REFRACTION_MANIFEST
OD_CYLINDER: -1.75
OD_VA1: 20/60-1
OS_SPHERE: +2.25
OS_VA1: 20/60
OS_AXIS: 110
OD_AXIS: 025
OS_CYLINDER: -1.00
OU_VA: 20/40-2
OD_SPHERE: +1.00

## 2025-05-06 ASSESSMENT — VISUAL ACUITY
OD_BCVA: 20/40-2
OS_BCVA: 20/60+1

## 2025-05-06 ASSESSMENT — CONFRONTATIONAL VISUAL FIELD TEST (CVF)
OS_FINDINGS: FULL
OD_FINDINGS: FULL

## 2025-05-15 ENCOUNTER — OFFICE (OUTPATIENT)
Dept: URBAN - METROPOLITAN AREA CLINIC 100 | Facility: CLINIC | Age: 80
Setting detail: OPHTHALMOLOGY
End: 2025-05-15
Payer: COMMERCIAL

## 2025-05-15 DIAGNOSIS — H35.371: ICD-10-CM

## 2025-05-15 DIAGNOSIS — H25.13: ICD-10-CM

## 2025-05-15 DIAGNOSIS — H40.013: ICD-10-CM

## 2025-05-15 PROCEDURE — 76514 ECHO EXAM OF EYE THICKNESS: CPT | Performed by: OPHTHALMOLOGY

## 2025-05-15 PROCEDURE — 92133 CPTRZD OPH DX IMG PST SGM ON: CPT | Performed by: OPHTHALMOLOGY

## 2025-05-15 PROCEDURE — 92083 EXTENDED VISUAL FIELD XM: CPT | Performed by: OPHTHALMOLOGY

## 2025-05-15 PROCEDURE — 99213 OFFICE O/P EST LOW 20 MIN: CPT | Performed by: OPHTHALMOLOGY

## 2025-05-15 ASSESSMENT — KERATOMETRY
OD_K2POWER_DIOPTERS: 44.25
OS_K2POWER_DIOPTERS: 44.00
OD_K1POWER_DIOPTERS: 42.25
OS_K1POWER_DIOPTERS: 43.00
OD_AXISANGLE_DEGREES: 099
OS_AXISANGLE_DEGREES: 087

## 2025-05-15 ASSESSMENT — REFRACTION_MANIFEST
OD_CYLINDER: -1.75
OS_CYLINDER: -1.00
OD_SPHERE: +1.00
OS_AXIS: 110
OD_AXIS: 025
OU_VA: 20/40-2
OS_VA1: 20/60
OD_VA1: 20/60-1
OS_SPHERE: +2.25

## 2025-05-15 ASSESSMENT — REFRACTION_CURRENTRX
OS_AXIS: 114
OS_OVR_VA: 20/
OD_ADD: +2.75
OD_SPHERE: +2.00
OD_OVR_VA: 20/
OD_CYLINDER: -2.25
OS_CYLINDER: -0.50
OS_ADD: +2.75
OD_AXIS: 012
OS_SPHERE: +2.00

## 2025-05-15 ASSESSMENT — REFRACTION_AUTOREFRACTION
OD_CYLINDER: -1.50
OD_AXIS: 026
OD_SPHERE: +1.00
OS_AXIS: 120
OS_CYLINDER: -0.75
OS_SPHERE: +2.00

## 2025-05-15 ASSESSMENT — VISUAL ACUITY
OD_BCVA: 20/50+2
OS_BCVA: 20/70-

## 2025-05-15 ASSESSMENT — CONFRONTATIONAL VISUAL FIELD TEST (CVF)
OS_FINDINGS: FULL
OD_FINDINGS: FULL

## 2025-05-15 ASSESSMENT — PACHYMETRY
OD_CT_CORRECTION: 0
OS_CT_CORRECTION: -1
OD_CT_UM: 548
OS_CT_UM: 565

## 2025-05-30 ENCOUNTER — OFFICE (OUTPATIENT)
Dept: URBAN - METROPOLITAN AREA CLINIC 88 | Facility: CLINIC | Age: 80
Setting detail: OPHTHALMOLOGY
End: 2025-05-30
Payer: COMMERCIAL

## 2025-05-30 DIAGNOSIS — H43.813: ICD-10-CM

## 2025-05-30 DIAGNOSIS — H35.371: ICD-10-CM

## 2025-05-30 PROBLEM — H40.013 GLAUCOMA SUSPECT, LOW RISK 1-2 FACTORS; BOTH EYES: Status: ACTIVE | Noted: 2025-05-06

## 2025-05-30 PROBLEM — H52.7 REFRACTIVE ERROR: Status: ACTIVE | Noted: 2025-05-06

## 2025-05-30 PROBLEM — H25.13 CATARACT SENILE NUCLEAR SCLEROSIS; BOTH EYES: Status: ACTIVE | Noted: 2025-05-06

## 2025-05-30 PROCEDURE — 92202 OPSCPY EXTND ON/MAC DRAW: CPT | Performed by: OPHTHALMOLOGY

## 2025-05-30 PROCEDURE — 92014 COMPRE OPH EXAM EST PT 1/>: CPT | Performed by: OPHTHALMOLOGY

## 2025-05-30 PROCEDURE — 92134 CPTRZ OPH DX IMG PST SGM RTA: CPT | Performed by: OPHTHALMOLOGY

## 2025-05-30 ASSESSMENT — KERATOMETRY
OS_K2POWER_DIOPTERS: 44.00
OD_K2POWER_DIOPTERS: 44.25
OS_K1POWER_DIOPTERS: 43.00
OD_AXISANGLE_DEGREES: 099
OD_K1POWER_DIOPTERS: 42.25
OS_AXISANGLE_DEGREES: 087

## 2025-05-30 ASSESSMENT — CONFRONTATIONAL VISUAL FIELD TEST (CVF)
OS_FINDINGS: FULL
OD_FINDINGS: FULL

## 2025-05-30 ASSESSMENT — VISUAL ACUITY
OS_BCVA: 20/60
OD_BCVA: 20/50-2

## 2025-05-30 ASSESSMENT — REFRACTION_AUTOREFRACTION
OD_AXIS: 026
OS_AXIS: 120
OS_SPHERE: +2.00
OD_CYLINDER: -1.50
OD_SPHERE: +1.00
OS_CYLINDER: -0.75

## 2025-05-30 ASSESSMENT — PACHYMETRY
OD_CT_UM: 548
OS_CT_CORRECTION: -1
OS_CT_UM: 565
OD_CT_CORRECTION: 0

## 2025-05-30 ASSESSMENT — TONOMETRY: OS_IOP_MMHG: 18

## 2025-06-10 ENCOUNTER — OFFICE (OUTPATIENT)
Dept: URBAN - METROPOLITAN AREA CLINIC 6 | Facility: CLINIC | Age: 80
Setting detail: OPHTHALMOLOGY
End: 2025-06-10
Payer: COMMERCIAL

## 2025-06-10 DIAGNOSIS — H43.813: ICD-10-CM

## 2025-06-10 DIAGNOSIS — H40.013: ICD-10-CM

## 2025-06-10 DIAGNOSIS — H35.371: ICD-10-CM

## 2025-06-10 DIAGNOSIS — H52.03: ICD-10-CM

## 2025-06-10 DIAGNOSIS — H25.13: ICD-10-CM

## 2025-06-10 PROBLEM — H16.223 DRY EYE SYNDROME K SICCA; BOTH EYES: Status: ACTIVE | Noted: 2025-06-10

## 2025-06-10 PROCEDURE — 99213 OFFICE O/P EST LOW 20 MIN: CPT | Performed by: OPHTHALMOLOGY

## 2025-06-10 PROCEDURE — 92015 DETERMINE REFRACTIVE STATE: CPT | Performed by: OPHTHALMOLOGY

## 2025-06-10 ASSESSMENT — REFRACTION_MANIFEST
OD_SPHERE: +1.25
OD_VA1: 20/25-3
OU_VA: 20/25-4
OD_CYLINDER: -2.00
OS_CYLINDER: -1.00
OD_SPHERE: +1.25
OD_VA1: 20/25-3
OD_CYLINDER: -2.00
OS_VA1: 20/25-3
OS_AXIS: 115
OS_ADD: +2.50
OD_AXIS: 020
OS_SPHERE: +2.25
OS_AXIS: 110
OU_VA: 20/25-4
OS_SPHERE: +2.25
OD_ADD: +2.50
OD_AXIS: 020
OS_VA1: 20/25-3
OS_CYLINDER: -0.75

## 2025-06-10 ASSESSMENT — REFRACTION_CURRENTRX
OS_VPRISM_DIRECTION: BF
OD_CYLINDER: -2.25
OD_SPHERE: +2.00
OS_OVR_VA: 20/
OS_AXIS: 118
OD_ADD: +2.25
OS_ADD: +2.25
OD_VPRISM_DIRECTION: BF
OS_SPHERE: +2.00
OD_OVR_VA: 20/
OS_CYLINDER: -0.50
OD_AXIS: 014

## 2025-06-10 ASSESSMENT — KERATOMETRY
OS_AXISANGLE_DEGREES: 089
OS_K1POWER_DIOPTERS: 43.00
OD_K1POWER_DIOPTERS: 42.25
OD_K2POWER_DIOPTERS: 45.00
OS_K2POWER_DIOPTERS: 43.50
OD_AXISANGLE_DEGREES: 101
METHOD_AUTO_MANUAL: AUTO

## 2025-06-10 ASSESSMENT — TONOMETRY: OS_IOP_MMHG: 21

## 2025-06-10 ASSESSMENT — DECREASING TEAR LAKE - SEVERITY SCORE
OS_DEC_TEARLAKE: 1+
OD_DEC_TEARLAKE: T

## 2025-06-10 ASSESSMENT — REFRACTION_AUTOREFRACTION
OD_SPHERE: +1.25
OS_CYLINDER: -1.00
OS_AXIS: 112
OD_AXIS: 020
OS_SPHERE: +2.25
OD_CYLINDER: -2.00

## 2025-06-10 ASSESSMENT — CONFRONTATIONAL VISUAL FIELD TEST (CVF)
OS_FINDINGS: FULL
OD_FINDINGS: FULL

## 2025-06-10 ASSESSMENT — VISUAL ACUITY
OS_BCVA: 20/50-2
OD_BCVA: 20/30-2

## 2025-06-10 ASSESSMENT — PACHYMETRY
OS_CT_CORRECTION: -1
OS_CT_UM: 565